# Patient Record
Sex: MALE | Race: WHITE | NOT HISPANIC OR LATINO | Employment: FULL TIME | URBAN - METROPOLITAN AREA
[De-identification: names, ages, dates, MRNs, and addresses within clinical notes are randomized per-mention and may not be internally consistent; named-entity substitution may affect disease eponyms.]

---

## 2024-06-10 ENCOUNTER — TELEPHONE (OUTPATIENT)
Dept: PLASTIC SURGERY | Facility: CLINIC | Age: 34
End: 2024-06-10
Payer: COMMERCIAL

## 2024-06-10 DIAGNOSIS — F64.0 GENDER DYSPHORIA IN ADULT: Primary | ICD-10-CM

## 2024-06-10 NOTE — CONFIDENTIAL NOTE
[facility] :  Care Coordination Note     SITUATION   Leila Norton (she/her) is a 33 year old adult who is receiving support for:  Care Team  .    BACKGROUND     Pt is scheduled for a full depth vaginoplasty consult with Judy Lazo. Pt is primarily interested in PPT.     ASSESSMENT     Surgery              CGC Assessment  Comprehensive Gender Care (CGC) Enrollment: (P) Enrolled  Patient has a therapist: (P) Yes  Letter of support #1: (P) Requested  Surgery being considered: (P) Yes  Vaginoplasty: (P) Yes    Pt reports:   Occasional nicotine use  HRT 2.5 years  No other gender affirming surgeries  PLAN          Nursing Interventions:       Follow-up plan:  1. Hair removal  2. Obtain ELIAS Rendon

## 2024-06-11 NOTE — TELEPHONE ENCOUNTER
FUTURE VISIT INFORMATION      FUTURE VISIT INFORMATION:  Date: 6/17/24  Time: 9:00am  Location: [facility] [place]  REFERRAL INFORMATION:  Reason for visit/diagnosis  full depth vaginoplasty consult     RECORDS REQUESTED FROM:       No recs to collect

## 2024-06-17 ENCOUNTER — PRE VISIT (OUTPATIENT)
Dept: PLASTIC SURGERY | Facility: CLINIC | Age: 34
End: 2024-06-17

## 2024-06-17 ENCOUNTER — OFFICE VISIT (OUTPATIENT)
Dept: PLASTIC SURGERY | Facility: CLINIC | Age: 34
End: 2024-06-17
Payer: COMMERCIAL

## 2024-06-17 VITALS
BODY MASS INDEX: 37.52 KG/M2 | SYSTOLIC BLOOD PRESSURE: 120 MMHG | OXYGEN SATURATION: 98 % | HEIGHT: 69 IN | WEIGHT: 253.3 LBS | HEART RATE: 72 BPM | DIASTOLIC BLOOD PRESSURE: 77 MMHG

## 2024-06-17 DIAGNOSIS — F64.0 GENDER DYSPHORIA IN ADULT: Primary | ICD-10-CM

## 2024-06-17 PROCEDURE — 99204 OFFICE O/P NEW MOD 45 MIN: CPT | Performed by: NURSE PRACTITIONER

## 2024-06-17 RX ORDER — ZALEPLON 10 MG/1
20 CAPSULE ORAL AT BEDTIME
COMMUNITY
Start: 2024-06-06

## 2024-06-17 RX ORDER — LURASIDONE HYDROCHLORIDE 40 MG/1
1 TABLET, FILM COATED ORAL
COMMUNITY
Start: 2023-09-12

## 2024-06-17 RX ORDER — LAMOTRIGINE 200 MG/1
1 TABLET ORAL
COMMUNITY
Start: 2024-04-16

## 2024-06-17 RX ORDER — BUSPIRONE HYDROCHLORIDE 15 MG/1
15 TABLET ORAL
COMMUNITY
Start: 2024-06-03

## 2024-06-17 RX ORDER — ESTRADIOL VALERATE 20 MG/ML
INJECTION INTRAMUSCULAR
COMMUNITY
Start: 2024-04-25

## 2024-06-17 RX ORDER — FINASTERIDE 5 MG/1
1 TABLET, FILM COATED ORAL
COMMUNITY
Start: 2024-06-11

## 2024-06-17 RX ORDER — PROGESTERONE 200 MG/1
200 CAPSULE ORAL EVERY EVENING
COMMUNITY
Start: 2024-06-06

## 2024-06-17 ASSESSMENT — PAIN SCALES - GENERAL: PAINLEVEL: NO PAIN (0)

## 2024-06-17 NOTE — PROGRESS NOTES
"    Name: Felizaundrea Norton \"Leila\"    MRN: [id number]   YOB: 1990                 Chief Complaint:   Gender Dysphoria            History of Present Illness:   Leila is a 33 year old transgender female seen in consultation for gender dysphoria    Patient transitioned starting over 3 years ago. (3 years and 3 months)  Preferred pronouns are: she/her/hers  The patient has been on exogenous hormones since: Sept 10, 2021.  In terms of an intimate relationship and support system, the patient has two partners and group of friends in [place] [facility]. She lives with partner, Emilie. Works as   In terms of fertility, the patient: has already had a vasectomy    (New)  The patient has not yet obtained letters of support from her therapist, but has one who will write it.    (Prior Surgery)  The patient has previously undergone no gender affirming surgery.     Long-term surgical goals for the patient include: penile preserving vaginoplasty. She does not have dysphoria about her penis, but is wanting a full depth canal. She wants an orchiectomy to remove testicles, but wants to keep scrotal skin for a possible future labiaplasty. She does not want labiaplasty at time of vaginoplasty    The patient is here today expressing interest in penile preserving vaginoplasty.    The patient has not begun hair removal. She would like to keep scrotal skin for possible future labiaplasty, so prefers canal be created using alternate sin grafts and peritoneal tissue.         Past Medical History:   No past medical history on file.  Gender dysphoria  Bipolar d/o 2         Past Surgical History:   No past surgical history on file.  ACL reconstruction right knee  Vasectomy         Social History:     Social History     Tobacco Use    Smoking status: Some Days     Types: Cigars, Cigarettes     Start date: 12/31/2023     Last attempt to quit: 2021     Years since quitting: 3.4    Smokeless tobacco: Never    Tobacco " "comments:     1 cigar 2x/month   Substance Use Topics    Alcohol use: Not on file   Occasional cigar         Family History:   No family history on file.           Allergies:     Allergies   Allergen Reactions    Demerol Hcl [Meperidine] Itching            Medications:     Current Outpatient Medications   Medication Sig Dispense Refill    busPIRone (BUSPAR) 15 MG tablet Take 15 mg by mouth      estradiol valerate (DELESTROGEN) 20 MG/ML injection INJECT 0.5ML BY INTRAMUSCULAR ROUTE ONCE EVERY WEEK      finasteride (PROSCAR) 5 MG tablet Take 1 tablet by mouth daily at 2 pm      lamoTRIgine (LAMICTAL) 200 MG tablet Take 1 tablet by mouth daily at 2 pm      lurasidone (LATUDA) 40 MG TABS tablet Take 1 tablet by mouth daily at 2 pm      progesterone (PROMETRIUM) 200 MG capsule Take 200 mg by mouth every evening      zaleplon (SONATA) 10 MG capsule Take 20 mg by mouth at bedtime       No current facility-administered medications for this visit.             Review of Systems:    ROS: ROS neg other than the symptoms noted above in the HPI.          Physical Exam:   /77 (BP Location: Left arm, Patient Position: Sitting, Cuff Size: Adult Large)   Pulse 72   Ht 1.753 m (5' 9\")   Wt 114.9 kg (253 lb 4.8 oz)   SpO2 98%   BMI 37.41 kg/m    General: age-appropriate in NAD  HEENT: Head AT/NC, EOMI, CN Grossly intact  Resp: no respiratory distress  :  exam deferred  Neuro: grossly intact  Skin: clear of rashes or ecchymoses.       Outside records:    I spent 10 minutes reviewing outside records.         Assessment and Plan:   33 year old transgender female with gender dysphoria    The criteria for genital surgery are specific to the type of surgery being requested.  Criteria for bottom surgery:    1. Persistent, well documented gender dysphoria;  2. Capacity to make a fully informed decision and to consent for treatment;  3. Age of consent (>18 years old)  4. If significant medical or mental health concerns are " present, they must be well controlled.  5. 6 continuous months of hormone therapy as appropriate to the patient s gender goals (unless  the patient has a medical contraindication or is otherwise unable or unwilling to take  Hormones).  6. One letter of support    The aim of hormone therapy prior to gonadectomy is primarily to introduce a period of reversible  estrogen or testosterone suppression, before the patient undergoes irreversible surgical intervention.    I reviewed the steps of orchiectomy. I reviewed the surgical procedure. I reviewed the risks and benefits including bleeding, infection and irreversible nature of the procedure. The patient would like orchiectomy as part of penile-preserving vaginoplasty.    Hair removal is a requirement prior to full depth vaginoplasty as the genital skin will be placed in the vaginal cavity. Lack of hair removal would lead to complications related to intravaginal hair. This is nearly impossible to remove postoperatively. - We discussed this may be different if surgical plan does not include using scrotal skin ( to be discussed with Dr Jones)    She has a persistent, well documented gender dysphoria. She has capacity to make a fully informed decision and to consent for treatment. Her mental health issues are well controlled. She has been on continuous hormones for years. She will obtain one letter of support.     The patient meets all of these criteria. We discussed that gender affirmation surgery should be considered permanent. We discussed risks/complications of rectal injury, rectovaginal fistula, bleeding, fluid collection, infection, injury to surrounding structures, flap loss, sensory loss, wound dehiscence, vaginal prolapse, vaginal shrinkage/stenosis, need for lifelong dilation, urinary stream abnormalities, DVT/PE and need for revision surgery.     We discussed the option for minimal depth and full depth. She would like full depth penile-preserving vaginoplasty.  She wants to keep scrotal skin for possible future labiaplasty - prefers labiaplasty not be done at time of canal creation.     We also discussed the need to stop hormones jolene-procedurally for 1 week before and after surgery.     We discussed that transgender vaginoplasty for this patient would include: orchiectomy, creation of a vagina, skin graft, colpopexy to suspend the vagina.       May not need hair removal if proceeds with penile preserving vaginoplasty  Needs letter of support, physical exam with Dr Jones prior auth      Plan: Patient to meet with Dr Jones to discuss surgical goals and plan for proceeding with penile-preserving vaginoplasty with orchiectomy and preservation of scrotal skin    F/U: RTC as scheduled with TERRY Escalante, [facility]    55 minutes spent on day of encounter doing chart review, history and exam, consultation and education, and additional activities as including in note above.

## 2024-06-17 NOTE — NURSING NOTE
"Chief Complaint   Patient presents with    Consult     FD vaginoplasty.       Vitals:    06/17/24 0906   BP: 120/77   BP Location: Left arm   Patient Position: Sitting   Cuff Size: Adult Large   Pulse: 72   SpO2: 98%   Weight: 114.9 kg (253 lb 4.8 oz)   Height: 1.753 m (5' 9\")       Body mass index is 37.41 kg/m .      Orlin Aguilera EMT    "

## 2024-06-17 NOTE — LETTER
"6/17/2024       RE: Feliz Norton  [address]  [facility] [address]       Dear Colleague,    Thank you for referring your patient, Feliz Norton, to the [facility] PLASTIC [facility] REC[facility] SURGERY CLINIC [place] at [facility]. Please see a copy of my visit note below.        Name: Feliz Norton \"Leila\"    MRN: [id number]   YOB: 1990                 Chief Complaint:   Gender Dysphoria            History of Present Illness:   Leila is a 33 year old transgender female seen in consultation for gender dysphoria    Patient transitioned starting over 3 years ago. (3 years and 3 months)  Preferred pronouns are: she/her/hers  The patient has been on exogenous hormones since: Sept 10, 2021.  In terms of an intimate relationship and support system, the patient has two partners and group of friends in [place] [facility]. She lives with partner, Emilie. Works as   In terms of fertility, the patient: has already had a vasectomy    (New)  The patient has not yet obtained letters of support from her therapist, but has one who will write it.    (Prior Surgery)  The patient has previously undergone no gender affirming surgery.     Long-term surgical goals for the patient include: penile preserving vaginoplasty. She does not have dysphoria about her penis, but is wanting a full depth canal. She wants an orchiectomy to remove testicles, but wants to keep scrotal skin for a possible future labiaplasty. She does not want labiaplasty at time of vaginoplasty    The patient is here today expressing interest in penile preserving vaginoplasty.    The patient has not begun hair removal. She would like to keep scrotal skin for possible future labiaplasty, so prefers canal be created using alternate sin grafts and peritoneal tissue.         Past Medical History:   No past medical history on file.  Gender dysphoria  Bipolar d/o 2         " "Past Surgical History:   No past surgical history on file.  ACL reconstruction right knee  Vasectomy         Social History:     Social History     Tobacco Use    Smoking status: Some Days     Types: Cigars, Cigarettes     Start date: 12/31/2023     Last attempt to quit: 2021     Years since quitting: 3.4    Smokeless tobacco: Never    Tobacco comments:     1 cigar 2x/month   Substance Use Topics    Alcohol use: Not on file   Occasional cigar         Family History:   No family history on file.           Allergies:     Allergies   Allergen Reactions    Demerol Hcl [Meperidine] Itching            Medications:     Current Outpatient Medications   Medication Sig Dispense Refill    busPIRone (BUSPAR) 15 MG tablet Take 15 mg by mouth      estradiol valerate (DELESTROGEN) 20 MG/ML injection INJECT 0.5ML BY INTRAMUSCULAR ROUTE ONCE EVERY WEEK      finasteride (PROSCAR) 5 MG tablet Take 1 tablet by mouth daily at 2 pm      lamoTRIgine (LAMICTAL) 200 MG tablet Take 1 tablet by mouth daily at 2 pm      lurasidone (LATUDA) 40 MG TABS tablet Take 1 tablet by mouth daily at 2 pm      progesterone (PROMETRIUM) 200 MG capsule Take 200 mg by mouth every evening      zaleplon (SONATA) 10 MG capsule Take 20 mg by mouth at bedtime       No current facility-administered medications for this visit.             Review of Systems:    ROS: ROS neg other than the symptoms noted above in the HPI.          Physical Exam:   /77 (BP Location: Left arm, Patient Position: Sitting, Cuff Size: Adult Large)   Pulse 72   Ht 1.753 m (5' 9\")   Wt 114.9 kg (253 lb 4.8 oz)   SpO2 98%   BMI 37.41 kg/m    General: age-appropriate in NAD  HEENT: Head AT/NC, EOMI, CN Grossly intact  Resp: no respiratory distress  :  exam deferred  Neuro: grossly intact  Skin: clear of rashes or ecchymoses.       Outside records:    I spent 10 minutes reviewing outside records.         Assessment and Plan:   33 year old transgender female with gender " dysphoria    The criteria for genital surgery are specific to the type of surgery being requested.  Criteria for bottom surgery:    1. Persistent, well documented gender dysphoria;  2. Capacity to make a fully informed decision and to consent for treatment;  3. Age of consent (>18 years old)  4. If significant medical or mental health concerns are present, they must be well controlled.  5. 6 continuous months of hormone therapy as appropriate to the patient s gender goals (unless  the patient has a medical contraindication or is otherwise unable or unwilling to take  Hormones).  6. One letter of support    The aim of hormone therapy prior to gonadectomy is primarily to introduce a period of reversible  estrogen or testosterone suppression, before the patient undergoes irreversible surgical intervention.    I reviewed the steps of orchiectomy. I reviewed the surgical procedure. I reviewed the risks and benefits including bleeding, infection and irreversible nature of the procedure. The patient would like orchiectomy as part of penile-preserving vaginoplasty.    Hair removal is a requirement prior to full depth vaginoplasty as the genital skin will be placed in the vaginal cavity. Lack of hair removal would lead to complications related to intravaginal hair. This is nearly impossible to remove postoperatively. - We discussed this may be different if surgical plan does not include using scrotal skin ( to be discussed with Dr Jones)    She has a persistent, well documented gender dysphoria. She has capacity to make a fully informed decision and to consent for treatment. Her mental health issues are well controlled. She has been on continuous hormones for years. She will obtain one letter of support.     The patient meets all of these criteria. We discussed that gender affirmation surgery should be considered permanent. We discussed risks/complications of rectal injury, rectovaginal fistula, bleeding, fluid collection,  infection, injury to surrounding structures, flap loss, sensory loss, wound dehiscence, vaginal prolapse, vaginal shrinkage/stenosis, need for lifelong dilation, urinary stream abnormalities, DVT/PE and need for revision surgery.     We discussed the option for minimal depth and full depth. She would like full depth penile-preserving vaginoplasty. She wants to keep scrotal skin for possible future labiaplasty - prefers labiaplasty not be done at time of canal creation.     We also discussed the need to stop hormones jolene-procedurally for 1 week before and after surgery.     We discussed that transgender vaginoplasty for this patient would include: orchiectomy, creation of a vagina, skin graft, colpopexy to suspend the vagina.       May not need hair removal if proceeds with penile preserving vaginoplasty  Needs letter of support, physical exam with Dr Jones prior auth      Plan: Patient to meet with Dr Jones to discuss surgical goals and plan for proceeding with penile-preserving vaginoplasty with orchiectomy and preservation of scrotal skin    F/U: RTC as scheduled with Dr Jones    55 minutes spent on day of encounter doing chart review, history and exam, consultation and education, and additional activities as including in note above.          Again, thank you for allowing me to participate in the care of your patient.      Sincerely,    TERRY Jose CNP

## 2024-06-17 NOTE — LETTER
"[facility] PLASTIC AND RECONSTRUCTIVE SURGERY CLINIC [address]  [address] FLO[address]  [facility] [id number] [phone]      June 17, 2024    RE:  \"Leila\" Feliz Norton                                                                                                                                                       [address]  [facility] [address]      To whom it may concern,    This letter is written to detail the medically necessity of hair removal for Leila as part of preoperative preparation for full depth vaginoplasty. Leila was seen in clinic for a full depth vaginoplasty consultation on 06/17/24. Leila is diagnosed with gender dysphoria  (diagnosis code F64.0) and is a good candidate for full depth vaginoplasty. Hair removal from the genital region is a requirement prior to full depth vaginoplasty, as the genital skin will be placed in the vaginal cavity. Lack of hair removal would lead to complications related to intravaginal hair. Hair removal is nearly impossible to remove postoperatively. Therefore, hair removal prior to vaginoplasty is medically necessary. Hair removal options include Laser Hair Removal (CPT 01835) and/or Electrolysis (CPT 22810).       Sincerely,           TERRY Brito, CNP  Comprehensive Gender Care Program  [facility]              "

## 2024-06-19 ENCOUNTER — TELEPHONE (OUTPATIENT)
Dept: PLASTIC SURGERY | Facility: CLINIC | Age: 34
End: 2024-06-19
Payer: COMMERCIAL

## 2024-06-19 NOTE — TELEPHONE ENCOUNTER
Called pt to schedule an appointment to discuss penile preserving vaginoplasty with Dr. Jones. Scheduled pt in October, the next available appointment time. She has no there questions at this time.

## 2024-07-28 ENCOUNTER — HEALTH MAINTENANCE LETTER (OUTPATIENT)
Age: 34
End: 2024-07-28

## 2024-10-08 ENCOUNTER — OFFICE VISIT (OUTPATIENT)
Dept: PLASTIC SURGERY | Facility: CLINIC | Age: 34
End: 2024-10-08
Payer: COMMERCIAL

## 2024-10-08 VITALS
DIASTOLIC BLOOD PRESSURE: 85 MMHG | BODY MASS INDEX: 36.23 KG/M2 | OXYGEN SATURATION: 98 % | SYSTOLIC BLOOD PRESSURE: 142 MMHG | HEART RATE: 74 BPM | HEIGHT: 69 IN | WEIGHT: 244.6 LBS

## 2024-10-08 DIAGNOSIS — F64.9 GENDER DYSPHORIA: Primary | ICD-10-CM

## 2024-10-08 PROCEDURE — 99215 OFFICE O/P EST HI 40 MIN: CPT | Mod: KX | Performed by: UROLOGY

## 2024-10-08 ASSESSMENT — PAIN SCALES - GENERAL: PAINLEVEL: NO PAIN (0)

## 2024-10-08 NOTE — NURSING NOTE
"Chief Complaint   Patient presents with    RECHECK     Follow-up to discuss penile preserving vaginoplasty.       Vitals:    10/08/24 1439   BP: (!) 142/85   BP Location: Left arm   Patient Position: Sitting   Cuff Size: Adult Large   Pulse: 74   SpO2: 98%   Weight: 244 lb 9.6 oz   Height: 5' 9\"       Body mass index is 36.12 kg/m .    Orlin Aguilera EMT    "

## 2024-10-08 NOTE — PROGRESS NOTES
Leila is a 33 year old nonbinary trans female seen in consultation for gender dysphoria     Patient transitioned starting over 3 years ago.  Preferred pronouns are: she/her/hers  The patient has been on exogenous hormones since: Sept 10, 2021.  In terms of an intimate relationship and support system, the patient has two partners and group of friends in [facility]. She lives with partner, Emilie. Works as   In terms of fertility, the patient: has already had a vasectomy.     The patient has not yet obtained letters of support from her therapist, but has one who will write it.     The patient has previously undergone no gender affirming surgery.      Long-term surgical goals for the patient include: penile preserving vaginoplasty. She does not have dysphoria about her penis, but is wanting a full depth canal. She wants an orchiectomy to remove testicles, but wants to keep scrotal skin for a possible future labiaplasty. She does not want labiaplasty at time of vaginoplasty     The patient is here today expressing interest in penile preserving vaginoplasty.     The patient has not begun hair removal. She would like to keep scrotal skin for possible future labiaplasty, so prefers canal be created using alternate skin grafts and peritoneal tissue.  Working on letters of support.    NAD  Abdomen soft  Phallus WNL  Testicles in place.  Scrotum with some hair.    A/P:  Gender dysphoria  Seeking phallus preserving vaginoplasty.  Leave scrotal skin.  Perform orchiectomy.  Line canal with abdominal skin grafts. If not enough, then we would add peritoneal flaps.  She states she generally doesn't have sex with cis men. Thus, she would be happy with ~4-5 inches of depth or so,  5 days hospital.  Singh can probably be removed prior to stent.  Awaiting letter    Discussed this is an unconventional approach but perfectable reasonable and feasible. I discussed risks of surgery including fistula, poor aesthetic result,  wound infection,    Raji Jones MD    45 minutes spent by me on the date of the encounter doing chart review, history and exam, documentation and further activities per the note

## 2024-10-08 NOTE — LETTER
10/8/2024       RE: Feliz Norton  [address]  [place] [facility] [address]     Dear Colleague,    Thank you for referring your patient, Feliz Norton, to the [facility] PLASTIC AND RECONSTRUCTIVE SURGERY CLINIC [place] at [facility]. Please see a copy of my visit note below.    Leila is a 33 year old nonbinary trans female seen in consultation for gender dysphoria     Patient transitioned starting over 3 years ago.  Preferred pronouns are: she/her/hers  The patient has been on exogenous hormones since: Sept 10, 2021.  In terms of an intimate relationship and support system, the patient has two partners and group of friends in [place]. She lives with partner, Emilie. Works as   In terms of fertility, the patient: has already had a vasectomy.     The patient has not yet obtained letters of support from her therapist, but has one who will write it.     The patient has previously undergone no gender affirming surgery.      Long-term surgical goals for the patient include: penile preserving vaginoplasty. She does not have dysphoria about her penis, but is wanting a full depth canal. She wants an orchiectomy to remove testicles, but wants to keep scrotal skin for a possible future labiaplasty. She does not want labiaplasty at time of vaginoplasty     The patient is here today expressing interest in penile preserving vaginoplasty.     The patient has not begun hair removal. She would like to keep scrotal skin for possible future labiaplasty, so prefers canal be created using alternate skin grafts and peritoneal tissue.  Working on letters of support.    NAD  Abdomen soft  Phallus WNL  Testicles in place.  Scrotum with some hair.    A/P:  Gender dysphoria  Seeking phallus preserving vaginoplasty.  Leave scrotal skin.  Perform orchiectomy.  Line canal with abdominal skin grafts. If not enough, then we would add peritoneal flaps.  She  states she generally doesn't have sex with cis men. Thus, she would be happy with ~4-5 inches of depth or so,  5 days hospital.  Singh can probably be removed prior to stent.  Awaiting letter    Discussed this is an unconventional approach but perfectable reasonable and feasible. I discussed risks of surgery including fistula, poor aesthetic result, wound infection,    Raji Jones MD    45 minutes spent by me on the date of the encounter doing chart review, history and exam, documentation and further activities per the note        Again, thank you for allowing me to participate in the care of your patient.      Sincerely,    Raji Jones MD

## 2025-08-10 ENCOUNTER — HEALTH MAINTENANCE LETTER (OUTPATIENT)
Age: 35
End: 2025-08-10